# Patient Record
Sex: FEMALE | Race: BLACK OR AFRICAN AMERICAN | NOT HISPANIC OR LATINO | Employment: OTHER | URBAN - METROPOLITAN AREA
[De-identification: names, ages, dates, MRNs, and addresses within clinical notes are randomized per-mention and may not be internally consistent; named-entity substitution may affect disease eponyms.]

---

## 2024-05-03 ENCOUNTER — OFFICE VISIT (OUTPATIENT)
Dept: OPHTHALMOLOGY | Facility: CLINIC | Age: 69
End: 2024-05-03
Payer: COMMERCIAL

## 2024-05-03 DIAGNOSIS — H18.602 KERATOCONUS OF LEFT EYE: Primary | ICD-10-CM

## 2024-05-03 DIAGNOSIS — Z96.1 PSEUDOPHAKIA OF BOTH EYES: ICD-10-CM

## 2024-05-03 DIAGNOSIS — H40.003 GLAUCOMA SUSPECT OF BOTH EYES: ICD-10-CM

## 2024-05-03 DIAGNOSIS — H17.9 CORNEAL SCAR, LEFT EYE: ICD-10-CM

## 2024-05-03 LAB
AVERAGE RNFL TODAY (OD): 60
AVERAGE RNFL TODAY (OS): 90
KMAX (OS): 72.5 DIOPTERS
SIMK FLAT (OD): 44.6 DIOPTERS
SIMK FLAT (OS): 45.1 DIOPTERS
SIMK STEEP (OD): 47.9 DIOPTERS
SIMK STEEP (OS): 56.8 DIOPTERS

## 2024-05-03 PROCEDURE — 92133 CPTRZD OPH DX IMG PST SGM ON: CPT | Performed by: OPHTHALMOLOGY

## 2024-05-03 PROCEDURE — 99214 OFFICE O/P EST MOD 30 MIN: CPT | Performed by: OPHTHALMOLOGY

## 2024-05-03 PROCEDURE — 92025 CPTRIZED CORNEAL TOPOGRAPHY: CPT | Performed by: OPHTHALMOLOGY

## 2024-05-03 RX ORDER — ERGOCALCIFEROL 1.25 MG/1
1 CAPSULE ORAL
COMMUNITY

## 2024-05-03 RX ORDER — NAPROXEN SODIUM 220 MG/1
81 TABLET, FILM COATED ORAL DAILY
COMMUNITY
Start: 2023-11-04

## 2024-05-03 RX ORDER — ATORVASTATIN CALCIUM 80 MG/1
80 TABLET, FILM COATED ORAL NIGHTLY
COMMUNITY

## 2024-05-03 RX ORDER — METFORMIN HYDROCHLORIDE 500 MG/1
500 TABLET, EXTENDED RELEASE ORAL
COMMUNITY

## 2024-05-03 RX ORDER — TRIAMCINOLONE ACETONIDE 1 MG/G
OINTMENT TOPICAL
COMMUNITY
Start: 2024-03-27

## 2024-05-03 RX ORDER — ADAPALENE GEL USP, 0.3% 3 MG/G
GEL TOPICAL
COMMUNITY
Start: 2024-03-24

## 2024-05-03 RX ORDER — AMLODIPINE BESYLATE 5 MG/1
5 TABLET ORAL
COMMUNITY
Start: 2022-05-25

## 2024-05-03 RX ORDER — LATANOPROST 50 UG/ML
1 SOLUTION/ DROPS OPHTHALMIC NIGHTLY
Qty: 2.5 ML | Refills: 2 | Status: SHIPPED | OUTPATIENT
Start: 2024-05-03 | End: 2025-05-03

## 2024-05-03 ASSESSMENT — CONF VISUAL FIELD
OS_NORMAL: 1
OD_SUPERIOR_NASAL_RESTRICTION: 0
OS_INFERIOR_NASAL_RESTRICTION: 0
OS_SUPERIOR_TEMPORAL_RESTRICTION: 0
OS_SUPERIOR_NASAL_RESTRICTION: 0
OS_INFERIOR_TEMPORAL_RESTRICTION: 0
OD_INFERIOR_NASAL_RESTRICTION: 0
OD_SUPERIOR_TEMPORAL_RESTRICTION: 0
OD_INFERIOR_TEMPORAL_RESTRICTION: 0
OD_NORMAL: 1

## 2024-05-03 ASSESSMENT — ENCOUNTER SYMPTOMS
HEMATOLOGIC/LYMPHATIC NEGATIVE: 0
CONSTITUTIONAL NEGATIVE: 0
EYES NEGATIVE: 0
RESPIRATORY NEGATIVE: 0
ENDOCRINE NEGATIVE: 0
PSYCHIATRIC NEGATIVE: 0
CARDIOVASCULAR NEGATIVE: 0
MUSCULOSKELETAL NEGATIVE: 0
NEUROLOGICAL NEGATIVE: 0
GASTROINTESTINAL NEGATIVE: 0
ALLERGIC/IMMUNOLOGIC NEGATIVE: 0

## 2024-05-03 ASSESSMENT — SLIT LAMP EXAM - LIDS
COMMENTS: 2+ PTOSIS
COMMENTS: 2+ PTOSIS

## 2024-05-03 ASSESSMENT — VISUAL ACUITY
OS_PH_CC+: -1
OS_PH_CC: 20/60
OD_PH_SC+: -3
OS_CC: 20/125
OD_SC: 20/80
METHOD: SNELLEN - LINEAR
CORRECTION_TYPE: CONTACTS
OD_PH_SC: 20/40

## 2024-05-03 ASSESSMENT — TONOMETRY
OS_IOP_MMHG: 20
OD_IOP_MMHG: 18
IOP_METHOD: GOLDMANN APPLANATION

## 2024-05-03 ASSESSMENT — PACHYMETRY
OD_CT(UM): 559
OS_CT(UM): 625

## 2024-05-03 ASSESSMENT — EXTERNAL EXAM - RIGHT EYE: OD_EXAM: NORMAL

## 2024-05-03 ASSESSMENT — EXTERNAL EXAM - LEFT EYE: OS_EXAM: NORMAL

## 2024-05-03 NOTE — PROGRESS NOTES
Assessment/Plan   Diagnoses and all orders for this visit:  Keratoconus of left eye  Corneal scar, left eye  After carefully reviewing pentacam done today, I believe the pt does not have KCN but her irregular floresita and inf steepening is due to inf corneal scar (there is no back elevation and also the cornea is significantly thick centrally)  Regardless, the patient needs to be fitted with a new rigid gas permeable (RGP) (she ahs an appointment with TS in June)  -     Corneal Topography - OU - Both Eyes  Glaucoma suspect of both eyes  Pt off gtts (was on latanoprost qhs OU but slef stopped per notes from [facility])  Also, per [facility] notes (Dr. Davila), RNFL changes could be due to myopia not glaucoma  IOP 18/20 today  Recommend restart latanoprost qhs OU until pt is seen by Dr. Melo  -     OCT, Optic Nerve - OU - Both Eyes  -     latanoprost (Xalatan) 0.005 % ophthalmic solution; Administer 1 drop into both eyes once daily at bedtime.  Pseudophakia of both eyes    TS as scheduled  Dr. Melo 1-2 months

## 2024-05-14 ENCOUNTER — APPOINTMENT (OUTPATIENT)
Dept: OPHTHALMOLOGY | Facility: CLINIC | Age: 69
End: 2024-05-14
Payer: COMMERCIAL

## 2024-05-28 ENCOUNTER — APPOINTMENT (OUTPATIENT)
Dept: OPHTHALMOLOGY | Facility: CLINIC | Age: 69
End: 2024-05-28
Payer: COMMERCIAL

## 2024-06-21 ENCOUNTER — APPOINTMENT (OUTPATIENT)
Dept: OPHTHALMOLOGY | Facility: CLINIC | Age: 69
End: 2024-06-21
Payer: COMMERCIAL

## 2024-06-21 DIAGNOSIS — H52.213 IRREGULAR ASTIGMATISM OF BOTH EYES: Primary | ICD-10-CM

## 2024-06-21 DIAGNOSIS — H17.9 CORNEAL SCAR, LEFT EYE: ICD-10-CM

## 2024-06-21 DIAGNOSIS — H18.602 KERATOCONUS OF LEFT EYE: ICD-10-CM

## 2024-06-21 ASSESSMENT — CONF VISUAL FIELD
OS_INFERIOR_TEMPORAL_RESTRICTION: 0
OD_NORMAL: 1
OD_SUPERIOR_TEMPORAL_RESTRICTION: 0
OD_INFERIOR_NASAL_RESTRICTION: 0
OS_NORMAL: 1
OD_SUPERIOR_NASAL_RESTRICTION: 0
OS_SUPERIOR_NASAL_RESTRICTION: 0
METHOD: COUNTING FINGERS
OD_INFERIOR_TEMPORAL_RESTRICTION: 0
OS_SUPERIOR_TEMPORAL_RESTRICTION: 0
OS_INFERIOR_NASAL_RESTRICTION: 0

## 2024-06-21 ASSESSMENT — REFRACTION_CURRENTRX
OS_BASECURVE: 8.4
OD_DIAMETER: 16.0
OS_BASECURVE: 8.40
OS_SPHERE: +1.75
OD_BASECURVE: 8.40
OS_DIAMETER: 16.0
OS_BASECURVE: 5.96
OS_CYLINDER: SPHERE
OS_SPHERE: -13.00
OD_DIAMETER: 16.0
OD_CYLINDER: SPHERE
OS_DIAMETER: 16.0
OS_BASECURVE: 8.40
OD_CYLINDER: SPHERE
OD_DIAMETER: 16.0
OS_DIAMETER: 16.0
OD_BASECURVE: 8.40
OD_BASECURVE: 8.4
OD_SPHERE: PLANO
OD_SPHERE: PLANO
OD_SPHERE: +4.00
OS_CYLINDER: SPHERE
OD_BRAND: SYNERGEYES VS 3600-36-42
OS_SPHERE: PLANO
OS_BRAND: ROSE K2 PG
OD_CYLINDER: SPHERE
OD_BRAND: SYNERGEYES VS 3600-36-42
OS_CYLINDER: SPHERE
OS_DIAMETER: 10.2
OS_SPHERE: PLANO

## 2024-06-21 ASSESSMENT — VISUAL ACUITY
OD_PH_SC: 20/30
VA_OR_OD_CURRENT_RX: 20/20-2
VA_OR_OS_CURRENT_RX: 20/20-2
VA_OR_OS_CURRENT_RX: 20/25
OS_SC: 20/100
VA_OR_OD_CURRENT_RX: 20/25+2
VA_OR_OS_CURRENT_RX: 20/20-2
OS_CC: 20/80
METHOD: SNELLEN - LINEAR
OD_SC: 20/60
OS_PH_CC: 20/50
VA_OR_OS_CURRENT_RX: 20/50
OD_SC: 20/50-1
VA_OR_OD_CURRENT_RX: 20/20-2

## 2024-06-21 ASSESSMENT — ENCOUNTER SYMPTOMS
MUSCULOSKELETAL NEGATIVE: 0
CARDIOVASCULAR NEGATIVE: 0
PSYCHIATRIC NEGATIVE: 0
RESPIRATORY NEGATIVE: 0
ALLERGIC/IMMUNOLOGIC NEGATIVE: 0
GASTROINTESTINAL NEGATIVE: 0
HEMATOLOGIC/LYMPHATIC NEGATIVE: 0
ENDOCRINE NEGATIVE: 0
EYES NEGATIVE: 0
NEUROLOGICAL NEGATIVE: 0
CONSTITUTIONAL NEGATIVE: 0

## 2024-06-21 ASSESSMENT — EXTERNAL EXAM - LEFT EYE: OS_EXAM: NORMAL

## 2024-06-21 ASSESSMENT — SLIT LAMP EXAM - LIDS
COMMENTS: NORMAL
COMMENTS: NORMAL

## 2024-06-21 ASSESSMENT — PACHYMETRY
OS_CT(UM): 625
OD_CT(UM): 559

## 2024-06-21 ASSESSMENT — REFRACTION_MANIFEST
OD_AXIS: 055
OD_CYLINDER: -1.25
OD_SPHERE: -2.00

## 2024-06-21 ASSESSMENT — EXTERNAL EXAM - RIGHT EYE: OD_EXAM: NORMAL

## 2024-06-21 NOTE — PROGRESS NOTES
Irregular astigmatism OS>Right eye (OD). Refit to scleral lenses OU. Corneal scarring OS>>OD and previous Dx of KCN OS however was felt later (by Dr Salazar) that there is irregular astigmatism and corneal scarring without evidence of KCN.     Scleral lenses were ordered. VA corrects to 20/20 OD and OS.     Rtc for I and R and CL PUP visit. ABN and prior auth.

## 2024-07-15 ENCOUNTER — APPOINTMENT (OUTPATIENT)
Dept: OPHTHALMOLOGY | Facility: CLINIC | Age: 69
End: 2024-07-15
Payer: COMMERCIAL

## 2024-07-15 DIAGNOSIS — H40.003 GLAUCOMA SUSPECT OF BOTH EYES: Primary | ICD-10-CM

## 2024-07-15 PROCEDURE — 92133 CPTRZD OPH DX IMG PST SGM ON: CPT | Performed by: OPHTHALMOLOGY

## 2024-07-15 PROCEDURE — 99214 OFFICE O/P EST MOD 30 MIN: CPT | Performed by: OPHTHALMOLOGY

## 2024-07-15 PROCEDURE — 92083 EXTENDED VISUAL FIELD XM: CPT | Performed by: OPHTHALMOLOGY

## 2024-07-15 RX ORDER — LATANOPROST 50 UG/ML
1 SOLUTION/ DROPS OPHTHALMIC NIGHTLY
Qty: 2.5 ML | Refills: 2 | Status: SHIPPED | OUTPATIENT
Start: 2024-07-15 | End: 2025-07-15

## 2024-07-15 ASSESSMENT — SLIT LAMP EXAM - LIDS
COMMENTS: NORMAL
COMMENTS: NORMAL

## 2024-07-15 ASSESSMENT — VISUAL ACUITY
METHOD: SNELLEN - LINEAR
OD_SC: 20/200
OD_PH_SC: 2040
OS_CC: 20/100-1
CORRECTION_TYPE: CONTACTS

## 2024-07-15 ASSESSMENT — CUP TO DISC RATIO
OS_RATIO: 0.35
OD_RATIO: 0.4

## 2024-07-15 ASSESSMENT — PACHYMETRY
OD_CT(UM): 559
OS_CT(UM): 625

## 2024-07-15 ASSESSMENT — TONOMETRY
OD_IOP_MMHG: 06
OS_IOP_MMHG: 10
IOP_METHOD: GOLDMANN APPLANATION

## 2024-07-15 ASSESSMENT — EXTERNAL EXAM - RIGHT EYE: OD_EXAM: NORMAL

## 2024-07-15 ASSESSMENT — EXTERNAL EXAM - LEFT EYE: OS_EXAM: NORMAL

## 2024-07-15 ASSESSMENT — GONIOSCOPY
OS_TEMPORAL: 3/360
OD_TEMPORAL: 3/360

## 2024-07-15 NOTE — PROGRESS NOTES
1-oag on meds on latanoprost ou refilled  2-diabetes mellitus (DM)   3-israel tears ou  4-pco od  5-keratoconus, wears cl    Rto 3mos va check, mac oct ou and ? Yag od

## 2024-07-17 DIAGNOSIS — H40.003 GLAUCOMA SUSPECT OF BOTH EYES: ICD-10-CM

## 2024-07-17 RX ORDER — LATANOPROST 50 UG/ML
SOLUTION/ DROPS OPHTHALMIC
Qty: 2.5 ML | Refills: 2 | OUTPATIENT
Start: 2024-07-17

## 2024-07-31 ENCOUNTER — APPOINTMENT (OUTPATIENT)
Dept: OPHTHALMOLOGY | Facility: CLINIC | Age: 69
End: 2024-07-31
Payer: MEDICARE

## 2024-07-31 DIAGNOSIS — H18.602 KERATOCONUS OF LEFT EYE: Primary | ICD-10-CM

## 2024-07-31 DIAGNOSIS — H17.9 CORNEAL SCAR, LEFT EYE: ICD-10-CM

## 2024-07-31 PROCEDURE — 92012 INTRM OPH EXAM EST PATIENT: CPT | Performed by: OPTOMETRIST

## 2024-07-31 RX ORDER — SODIUM CHLORIDE FOR INHALATION 0.9 %
3 VIAL, NEBULIZER (ML) INHALATION AS NEEDED
Qty: 15 ML | Refills: 11 | Status: SHIPPED | OUTPATIENT
Start: 2024-07-31 | End: 2025-07-31

## 2024-07-31 ASSESSMENT — ENCOUNTER SYMPTOMS
RESPIRATORY NEGATIVE: 0
CONSTITUTIONAL NEGATIVE: 0
GASTROINTESTINAL NEGATIVE: 0
MUSCULOSKELETAL NEGATIVE: 0
NEUROLOGICAL NEGATIVE: 0
HEMATOLOGIC/LYMPHATIC NEGATIVE: 0
ALLERGIC/IMMUNOLOGIC NEGATIVE: 0
PSYCHIATRIC NEGATIVE: 0
EYES NEGATIVE: 0
ENDOCRINE NEGATIVE: 0
CARDIOVASCULAR NEGATIVE: 1

## 2024-07-31 ASSESSMENT — PACHYMETRY
OS_CT(UM): 625
OD_CT(UM): 559

## 2024-07-31 ASSESSMENT — REFRACTION_CURRENTRX
OD_BRAND: SYNERGEYES VS 3600-36-42
OS_DIAMETER: 16.0
OS_BASECURVE: 8.40
OD_CYLINDER: SPHERE
OD_SPHERE: +4.00
OD_BASECURVE: 8.40
OD_DIAMETER: 16.0
OS_SPHERE: +1.75
OS_CYLINDER: SPHERE

## 2024-07-31 ASSESSMENT — VISUAL ACUITY
METHOD: SNELLEN - LINEAR
VA_OR_OS_CURRENT_RX: 20/25
OD_SC: 20/100
OS_CC: 20/100
CORRECTION_TYPE: CONTACTS
VA_OR_OD_CURRENT_RX: 20/25

## 2024-07-31 NOTE — PROGRESS NOTES
Irregular astigmatism OS>Right eye (OD). Refit to scleral lenses OU. Corneal scarring OS>>OD and previous Dx of KCN OS however was felt later (by Dr Salazar) that there is irregular astigmatism and corneal scarring without evidence of KCN.     Scleral lenses were ordered. VA corrects to 20/20 OD and OS.     Completed  I and R and CL PUP visit.   Submitted saline 0.9% nasal inhalation to pharmacy.  VA and fit excellent.

## 2024-08-07 DIAGNOSIS — H40.003 GLAUCOMA SUSPECT OF BOTH EYES: ICD-10-CM

## 2024-08-08 RX ORDER — LATANOPROST 50 UG/ML
SOLUTION/ DROPS OPHTHALMIC
Qty: 2.5 ML | Refills: 2 | OUTPATIENT
Start: 2024-08-08

## 2024-08-26 ENCOUNTER — TELEPHONE (OUTPATIENT)
Dept: OPHTHALMOLOGY | Facility: CLINIC | Age: 69
End: 2024-08-26
Payer: COMMERCIAL

## 2024-08-26 NOTE — TELEPHONE ENCOUNTER
Mirian is experiencing discomfort and pain with her scleral lenses.  I sent over to  to get her in ASAP.

## 2024-09-04 ENCOUNTER — APPOINTMENT (OUTPATIENT)
Dept: OPHTHALMOLOGY | Facility: CLINIC | Age: 69
End: 2024-09-04
Payer: COMMERCIAL

## 2024-09-04 DIAGNOSIS — H17.9 CORNEAL SCAR, LEFT EYE: Primary | ICD-10-CM

## 2024-09-04 DIAGNOSIS — H18.602 KERATOCONUS OF LEFT EYE: ICD-10-CM

## 2024-09-04 PROCEDURE — 92012 INTRM OPH EXAM EST PATIENT: CPT | Performed by: OPTOMETRIST

## 2024-09-04 ASSESSMENT — REFRACTION_CURRENTRX
OS_CYLINDER: SPHERE
OS_DIAMETER: 16.0
OS_BASECURVE: 8.40
OD_SPHERE: +4.00
OD_BASECURVE: 8.40
OD_CYLINDER: SPHERE
OD_BRAND: SYNERGEYES VS 3600-36-42
OD_DIAMETER: 16.0
OS_SPHERE: +1.75

## 2024-09-04 ASSESSMENT — VISUAL ACUITY
METHOD: SNELLEN - LINEAR
OS_CC+: -1
CORRECTION_TYPE: CONTACTS
VA_OR_OS_CURRENT_RX: 20/25+2
OS_CC: 20/25
OD_CC: 20/25
OD_CC+: -1
VA_OR_OD_CURRENT_RX: 20/25-2

## 2024-09-04 ASSESSMENT — ENCOUNTER SYMPTOMS
GASTROINTESTINAL NEGATIVE: 0
MUSCULOSKELETAL NEGATIVE: 0
NEUROLOGICAL NEGATIVE: 0
ENDOCRINE NEGATIVE: 0
PSYCHIATRIC NEGATIVE: 0
EYES NEGATIVE: 0
RESPIRATORY NEGATIVE: 0
CONSTITUTIONAL NEGATIVE: 0
CARDIOVASCULAR NEGATIVE: 0
ALLERGIC/IMMUNOLOGIC NEGATIVE: 0
HEMATOLOGIC/LYMPHATIC NEGATIVE: 0

## 2024-09-04 ASSESSMENT — CUP TO DISC RATIO
OD_RATIO: 0.4
OS_RATIO: 0.35

## 2024-09-04 ASSESSMENT — SLIT LAMP EXAM - LIDS
COMMENTS: NORMAL
COMMENTS: NORMAL

## 2024-09-04 ASSESSMENT — EXTERNAL EXAM - RIGHT EYE: OD_EXAM: NORMAL

## 2024-09-04 ASSESSMENT — PACHYMETRY
OS_CT(UM): 625
OD_CT(UM): 559

## 2024-09-04 ASSESSMENT — EXTERNAL EXAM - LEFT EYE: OS_EXAM: NORMAL

## 2024-09-04 NOTE — PROGRESS NOTES
Irregular astigmatism OS>Right eye (OD). Refit to scleral lenses OU. Corneal scarring OS>>OD and previous Dx of KCN OS however was felt later (by Dr Salazar) that there is irregular astigmatism and corneal scarring without evidence of KCN.     Scleral lenses were ordered. VA corrects to 20/20 OD and OS.     Completed  I and R and CL PUP visit.   Submitted saline 0.9% nasal inhalation to pharmacy. Patient was able to get the saline.  VA and fit excellent.     Developed eye pain with the contact lenses in. Confirmed today that the correct lens was in the correct eye. It's mostly the left eye.     Start wearing the scleral lenses OD start 6 hours OS start 2 hours and add 2 hours every day. If OD can only be worn then do this. RTC 3 weeks for follow up visit. Can try to go back to smaller diameter rigid gas permeable (RGP) lenses or increase SAG scleral OS.

## 2024-09-30 ENCOUNTER — APPOINTMENT (OUTPATIENT)
Dept: OPHTHALMOLOGY | Facility: CLINIC | Age: 69
End: 2024-09-30
Payer: COMMERCIAL

## 2024-10-15 ENCOUNTER — APPOINTMENT (OUTPATIENT)
Dept: OPHTHALMOLOGY | Facility: CLINIC | Age: 69
End: 2024-10-15
Payer: MEDICARE

## 2024-11-07 DIAGNOSIS — H40.003 GLAUCOMA SUSPECT OF BOTH EYES: ICD-10-CM

## 2024-11-07 RX ORDER — LATANOPROST 50 UG/ML
SOLUTION/ DROPS OPHTHALMIC
Qty: 2.5 ML | Refills: 2 | Status: SHIPPED | OUTPATIENT
Start: 2024-11-07

## 2024-11-18 ENCOUNTER — APPOINTMENT (OUTPATIENT)
Dept: PRIMARY CARE | Facility: CLINIC | Age: 69
End: 2024-11-18
Payer: COMMERCIAL

## 2025-01-14 ENCOUNTER — APPOINTMENT (OUTPATIENT)
Dept: PRIMARY CARE | Facility: CLINIC | Age: 70
End: 2025-01-14
Payer: COMMERCIAL

## 2025-02-13 ENCOUNTER — APPOINTMENT (OUTPATIENT)
Dept: OPHTHALMOLOGY | Facility: CLINIC | Age: 70
End: 2025-02-13
Payer: COMMERCIAL

## 2025-02-13 DIAGNOSIS — H40.10X2 OPEN-ANGLE GLAUCOMA OF BOTH EYES, MODERATE STAGE, UNSPECIFIED OPEN-ANGLE GLAUCOMA TYPE: Primary | ICD-10-CM

## 2025-02-13 PROCEDURE — 99214 OFFICE O/P EST MOD 30 MIN: CPT | Performed by: OPHTHALMOLOGY

## 2025-02-13 ASSESSMENT — ENCOUNTER SYMPTOMS
ALLERGIC/IMMUNOLOGIC NEGATIVE: 0
PSYCHIATRIC NEGATIVE: 0
HEMATOLOGIC/LYMPHATIC NEGATIVE: 0
EYES NEGATIVE: 0
CONSTITUTIONAL NEGATIVE: 0
MUSCULOSKELETAL NEGATIVE: 0
RESPIRATORY NEGATIVE: 0
ENDOCRINE NEGATIVE: 0
GASTROINTESTINAL NEGATIVE: 0
CARDIOVASCULAR NEGATIVE: 0
NEUROLOGICAL NEGATIVE: 0

## 2025-02-13 ASSESSMENT — EXTERNAL EXAM - LEFT EYE: OS_EXAM: NORMAL

## 2025-02-13 ASSESSMENT — TONOMETRY
IOP_METHOD: GOLDMANN APPLANATION
OD_IOP_MMHG: 8
OS_IOP_MMHG: 11

## 2025-02-13 ASSESSMENT — SLIT LAMP EXAM - LIDS
COMMENTS: NORMAL
COMMENTS: NORMAL

## 2025-02-13 ASSESSMENT — CUP TO DISC RATIO
OS_RATIO: 0.35
OD_RATIO: 0.4

## 2025-02-13 ASSESSMENT — VISUAL ACUITY
OD_PH_SC: 20/40
METHOD: SNELLEN - LINEAR
OS_CC: 20/40
OD_SC: 20/80

## 2025-02-13 ASSESSMENT — PACHYMETRY
OS_CT(UM): 625
OD_CT(UM): 559

## 2025-02-13 ASSESSMENT — EXTERNAL EXAM - RIGHT EYE: OD_EXAM: NORMAL

## 2025-02-13 NOTE — PROGRESS NOTES
1-oag stable on meds  2-pco od>os  3-keratoconus ou    Rto yag od the os oct testing first rule out (r/o) cme

## 2025-04-02 ENCOUNTER — APPOINTMENT (OUTPATIENT)
Dept: PRIMARY CARE | Facility: CLINIC | Age: 70
End: 2025-04-02
Payer: MEDICARE

## 2025-04-08 ENCOUNTER — APPOINTMENT (OUTPATIENT)
Dept: PRIMARY CARE | Facility: CLINIC | Age: 70
End: 2025-04-08
Payer: MEDICARE

## 2025-05-06 ENCOUNTER — APPOINTMENT (OUTPATIENT)
Dept: OPHTHALMOLOGY | Facility: CLINIC | Age: 70
End: 2025-05-06
Payer: COMMERCIAL

## 2025-08-15 ENCOUNTER — APPOINTMENT (OUTPATIENT)
Dept: OPHTHALMOLOGY | Facility: CLINIC | Age: 70
End: 2025-08-15
Payer: MEDICARE

## 2025-08-15 DIAGNOSIS — H26.491 PCO (POSTERIOR CAPSULAR OPACIFICATION), RIGHT: Primary | ICD-10-CM

## 2025-08-15 DIAGNOSIS — H17.9 CORNEAL SCAR, LEFT EYE: ICD-10-CM

## 2025-08-15 PROCEDURE — 66821 AFTER CATARACT LASER SURGERY: CPT | Mod: RIGHT SIDE | Performed by: OPHTHALMOLOGY

## 2025-08-15 PROCEDURE — 99213 OFFICE O/P EST LOW 20 MIN: CPT | Performed by: OPHTHALMOLOGY

## 2025-08-15 ASSESSMENT — TONOMETRY
IOP_METHOD: TONOPEN
OD_IOP_MMHG: 11
OS_IOP_MMHG: 11

## 2025-08-15 ASSESSMENT — SLIT LAMP EXAM - LIDS
COMMENTS: 2+ PTOSIS
COMMENTS: 2+ PTOSIS

## 2025-08-15 ASSESSMENT — VISUAL ACUITY
OS_CC+: +2
METHOD: SNELLEN - LINEAR
OD_SC: 20/80
CORRECTION_TYPE: CONTACTS
OS_CC: 20/30

## 2025-08-15 ASSESSMENT — PACHYMETRY
OD_CT(UM): 559
OS_CT(UM): 625

## 2025-08-15 ASSESSMENT — EXTERNAL EXAM - RIGHT EYE: OD_EXAM: NORMAL

## 2025-08-15 ASSESSMENT — EXTERNAL EXAM - LEFT EYE: OS_EXAM: NORMAL

## 2025-08-15 ASSESSMENT — CUP TO DISC RATIO: OD_RATIO: 0.4

## 2025-08-19 DIAGNOSIS — Z12.31 ENCOUNTER FOR SCREENING MAMMOGRAM FOR BREAST CANCER: ICD-10-CM
